# Patient Record
Sex: MALE | Race: WHITE
[De-identification: names, ages, dates, MRNs, and addresses within clinical notes are randomized per-mention and may not be internally consistent; named-entity substitution may affect disease eponyms.]

---

## 2018-01-10 ENCOUNTER — HOSPITAL ENCOUNTER (OUTPATIENT)
Dept: HOSPITAL 45 - C.MRI | Age: 33
Discharge: HOME | End: 2018-01-10
Attending: PHYSICIAN ASSISTANT
Payer: COMMERCIAL

## 2018-01-10 DIAGNOSIS — G93.9: ICD-10-CM

## 2018-01-10 DIAGNOSIS — A69.20: Primary | ICD-10-CM

## 2018-03-21 ENCOUNTER — HOSPITAL ENCOUNTER (OUTPATIENT)
Dept: HOSPITAL 45 - C.LAB1850 | Age: 33
Discharge: HOME | End: 2018-03-21
Attending: INTERNAL MEDICINE
Payer: COMMERCIAL

## 2018-03-21 DIAGNOSIS — F41.9: Primary | ICD-10-CM

## 2018-03-21 DIAGNOSIS — A69.20: ICD-10-CM

## 2018-03-21 DIAGNOSIS — R11.0: ICD-10-CM

## 2018-03-21 DIAGNOSIS — F32.9: ICD-10-CM

## 2018-03-21 DIAGNOSIS — G47.00: ICD-10-CM

## 2018-03-21 LAB
ALBUMIN SERPL-MCNC: 4.4 GM/DL (ref 3.4–5)
ALP SERPL-CCNC: 44 U/L (ref 45–117)
ALT SERPL-CCNC: 23 U/L (ref 12–78)
AST SERPL-CCNC: 14 U/L (ref 15–37)
BASOPHILS # BLD: 0.02 K/UL (ref 0–0.2)
BASOPHILS NFR BLD: 0.3 %
BUN SERPL-MCNC: 19 MG/DL (ref 7–18)
CALCIUM SERPL-MCNC: 9.4 MG/DL (ref 8.5–10.1)
CO2 SERPL-SCNC: 27 MMOL/L (ref 21–32)
CREAT SERPL-MCNC: 0.93 MG/DL (ref 0.6–1.4)
EOS ABS #: 0.05 K/UL (ref 0–0.5)
EOSINOPHIL NFR BLD AUTO: 245 K/UL (ref 130–400)
GLUCOSE SERPL-MCNC: 93 MG/DL (ref 70–99)
HCT VFR BLD CALC: 46.1 % (ref 42–52)
HGB BLD-MCNC: 16 G/DL (ref 14–18)
IG#: 0.01 K/UL (ref 0–0.02)
IMM GRANULOCYTES NFR BLD AUTO: 22.6 %
LYMPHOCYTES # BLD: 1.56 K/UL (ref 1.2–3.4)
MCH RBC QN AUTO: 30.1 PG (ref 25–34)
MCHC RBC AUTO-ENTMCNC: 34.7 G/DL (ref 32–36)
MCV RBC AUTO: 86.8 FL (ref 80–100)
MONO ABS #: 0.46 K/UL (ref 0.11–0.59)
MONOCYTES NFR BLD: 6.7 %
NEUT ABS #: 4.81 K/UL (ref 1.4–6.5)
NEUTROPHILS # BLD AUTO: 0.7 %
NEUTROPHILS NFR BLD AUTO: 69.6 %
PMV BLD AUTO: 9.6 FL (ref 7.4–10.4)
POTASSIUM SERPL-SCNC: 4.1 MMOL/L (ref 3.5–5.1)
PROT SERPL-MCNC: 7.7 GM/DL (ref 6.4–8.2)
RED CELL DISTRIBUTION WIDTH CV: 12.5 % (ref 11.5–14.5)
RED CELL DISTRIBUTION WIDTH SD: 40 FL (ref 36.4–46.3)
SODIUM SERPL-SCNC: 137 MMOL/L (ref 136–145)
WBC # BLD AUTO: 6.91 K/UL (ref 4.8–10.8)

## 2018-04-09 ENCOUNTER — HOSPITAL ENCOUNTER (OUTPATIENT)
Dept: HOSPITAL 45 - C.LAB1850 | Age: 33
Discharge: HOME | End: 2018-04-09
Attending: INTERNAL MEDICINE
Payer: COMMERCIAL

## 2018-04-09 DIAGNOSIS — L80: ICD-10-CM

## 2018-04-09 DIAGNOSIS — R10.9: Primary | ICD-10-CM

## 2018-04-09 DIAGNOSIS — F41.9: ICD-10-CM

## 2018-04-09 LAB
ALBUMIN SERPL-MCNC: 4.6 GM/DL (ref 3.4–5)
ALP SERPL-CCNC: 48 U/L (ref 45–117)
ALT SERPL-CCNC: 21 U/L (ref 12–78)
AST SERPL-CCNC: 11 U/L (ref 15–37)
BASOPHILS # BLD: 0.02 K/UL (ref 0–0.2)
BASOPHILS NFR BLD: 0.2 %
BUN SERPL-MCNC: 16 MG/DL (ref 7–18)
CALCIUM SERPL-MCNC: 9.6 MG/DL (ref 8.5–10.1)
CO2 SERPL-SCNC: 26 MMOL/L (ref 21–32)
CREAT SERPL-MCNC: 1.36 MG/DL (ref 0.6–1.4)
EOS ABS #: 0.07 K/UL (ref 0–0.5)
EOSINOPHIL NFR BLD AUTO: 283 K/UL (ref 130–400)
GLUCOSE SERPL-MCNC: 98 MG/DL (ref 70–99)
HCT VFR BLD CALC: 52.3 % (ref 42–52)
HGB BLD-MCNC: 18.3 G/DL (ref 14–18)
IG#: 0.02 K/UL (ref 0–0.02)
IMM GRANULOCYTES NFR BLD AUTO: 14.5 %
LYMPHOCYTES # BLD: 1.27 K/UL (ref 1.2–3.4)
MCH RBC QN AUTO: 29.7 PG (ref 25–34)
MCHC RBC AUTO-ENTMCNC: 35 G/DL (ref 32–36)
MCV RBC AUTO: 84.9 FL (ref 80–100)
MONO ABS #: 0.51 K/UL (ref 0.11–0.59)
MONOCYTES NFR BLD: 5.8 %
NEUT ABS #: 6.84 K/UL (ref 1.4–6.5)
NEUTROPHILS # BLD AUTO: 0.8 %
NEUTROPHILS NFR BLD AUTO: 78.5 %
PMV BLD AUTO: 10.2 FL (ref 7.4–10.4)
POTASSIUM SERPL-SCNC: 4.3 MMOL/L (ref 3.5–5.1)
PROT SERPL-MCNC: 7.8 GM/DL (ref 6.4–8.2)
RED CELL DISTRIBUTION WIDTH CV: 12.2 % (ref 11.5–14.5)
RED CELL DISTRIBUTION WIDTH SD: 37.7 FL (ref 36.4–46.3)
SODIUM SERPL-SCNC: 134 MMOL/L (ref 136–145)
WBC # BLD AUTO: 8.73 K/UL (ref 4.8–10.8)

## 2018-04-10 ENCOUNTER — HOSPITAL ENCOUNTER (OUTPATIENT)
Dept: HOSPITAL 45 - C.LABSPEC | Age: 33
Discharge: HOME | End: 2018-04-10
Attending: INTERNAL MEDICINE
Payer: COMMERCIAL

## 2018-04-10 DIAGNOSIS — R10.9: Primary | ICD-10-CM

## 2018-04-11 LAB — ANA SCREEN  TC 249X: NEGATIVE

## 2018-04-17 ENCOUNTER — HOSPITAL ENCOUNTER (OUTPATIENT)
Dept: HOSPITAL 45 - C.LAB1850 | Age: 33
Discharge: HOME | End: 2018-04-17
Attending: INTERNAL MEDICINE
Payer: COMMERCIAL

## 2018-04-17 DIAGNOSIS — R10.9: Primary | ICD-10-CM

## 2018-04-17 LAB
ALBUMIN SERPL-MCNC: 4.2 GM/DL (ref 3.4–5)
ALP SERPL-CCNC: 59 U/L (ref 45–117)
ALT SERPL-CCNC: 21 U/L (ref 12–78)
AST SERPL-CCNC: 11 U/L (ref 15–37)
BASOPHILS # BLD: 0.04 K/UL (ref 0–0.2)
BASOPHILS NFR BLD: 0.6 %
BUN SERPL-MCNC: 16 MG/DL (ref 7–18)
CALCIUM SERPL-MCNC: 9 MG/DL (ref 8.5–10.1)
CO2 SERPL-SCNC: 29 MMOL/L (ref 21–32)
CREAT SERPL-MCNC: 0.93 MG/DL (ref 0.6–1.4)
EOS ABS #: 0.06 K/UL (ref 0–0.5)
EOSINOPHIL NFR BLD AUTO: 283 K/UL (ref 130–400)
GLUCOSE SERPL-MCNC: 92 MG/DL (ref 70–99)
HCT VFR BLD CALC: 47.7 % (ref 42–52)
HGB BLD-MCNC: 16.3 G/DL (ref 14–18)
IG#: 0.01 K/UL (ref 0–0.02)
IMM GRANULOCYTES NFR BLD AUTO: 20.9 %
LIPASE: 190 U/L (ref 73–393)
LYMPHOCYTES # BLD: 1.32 K/UL (ref 1.2–3.4)
MCH RBC QN AUTO: 29.4 PG (ref 25–34)
MCHC RBC AUTO-ENTMCNC: 34.2 G/DL (ref 32–36)
MCV RBC AUTO: 86.1 FL (ref 80–100)
MONO ABS #: 0.41 K/UL (ref 0.11–0.59)
MONOCYTES NFR BLD: 6.5 %
NEUT ABS #: 4.49 K/UL (ref 1.4–6.5)
NEUTROPHILS # BLD AUTO: 0.9 %
NEUTROPHILS NFR BLD AUTO: 70.9 %
PMV BLD AUTO: 10 FL (ref 7.4–10.4)
POTASSIUM SERPL-SCNC: 4.3 MMOL/L (ref 3.5–5.1)
PROT SERPL-MCNC: 7.5 GM/DL (ref 6.4–8.2)
RED CELL DISTRIBUTION WIDTH CV: 12.3 % (ref 11.5–14.5)
RED CELL DISTRIBUTION WIDTH SD: 39.4 FL (ref 36.4–46.3)
SODIUM SERPL-SCNC: 137 MMOL/L (ref 136–145)
WBC # BLD AUTO: 6.33 K/UL (ref 4.8–10.8)

## 2018-04-19 ENCOUNTER — HOSPITAL ENCOUNTER (OUTPATIENT)
Dept: HOSPITAL 45 - C.LAB1850 | Age: 33
Discharge: HOME | End: 2018-04-19
Attending: INTERNAL MEDICINE
Payer: COMMERCIAL

## 2018-04-19 DIAGNOSIS — E86.0: Primary | ICD-10-CM

## 2018-04-19 DIAGNOSIS — R19.8: ICD-10-CM

## 2018-05-20 ENCOUNTER — HOSPITAL ENCOUNTER (EMERGENCY)
Dept: HOSPITAL 45 - C.EDB | Age: 33
Discharge: HOME | End: 2018-05-20
Payer: COMMERCIAL

## 2018-05-20 VITALS
WEIGHT: 149.25 LBS | WEIGHT: 149.25 LBS | BODY MASS INDEX: 20.9 KG/M2 | HEIGHT: 70.98 IN | BODY MASS INDEX: 20.9 KG/M2 | HEIGHT: 70.98 IN

## 2018-05-20 VITALS — OXYGEN SATURATION: 96 % | DIASTOLIC BLOOD PRESSURE: 89 MMHG | SYSTOLIC BLOOD PRESSURE: 123 MMHG | HEART RATE: 94 BPM

## 2018-05-20 VITALS — TEMPERATURE: 98.06 F

## 2018-05-20 DIAGNOSIS — K64.5: Primary | ICD-10-CM

## 2018-05-20 DIAGNOSIS — Z79.899: ICD-10-CM

## 2018-05-20 DIAGNOSIS — J45.909: ICD-10-CM

## 2018-05-20 NOTE — EMERGENCY ROOM VISIT NOTE
ED Visit Note


First contact with patient:  11:52


CHIEF COMPLAINT: Rectal pain, lump








HISTORY OF PRESENT ILLNESS: This 32-year-old male patient presents to the 

emergency department, ambulatory, complaining of a lump projecting from the 

rectum with severe rectal pain.  The patient states the pain began yesterday, 

and is worse with moving his bowels.  He states the pain has worsened over the 

past 24 hours.  He denies any constipation or blood in his stool.  He does 

report significant pain with bowel movements.  He denies recent illness 

including nausea, vomiting, constipation.  He denies any fever.  He states this 

morning he noticed a protrusion from the rectum and became concerned for his 

intestine protruding.  The patient denies any history of hemorrhoids.  He does 

report a history of "GI issues", and states he is currently being worked up for 

a gastric ulcer/gastritis.  He has not seen a GI doctor or general surgeon 

regarding any of his concerns.  He denies any urinary symptoms.  He denies any 

abdominal or back pain.  He denies any injury.  He is sexually active, but 

denies any anal penetration.








REVIEW OF SYSTEMS: A 10 system review of systems was performed with positives 

and pertinent negatives listed in the history of present illness. All other 

systems were reviewed and are negative. 








ALLERGIES: None








MEDICATIONS: Ativan, lactobacillus acidophilus, probiotics








PMH: Lyme disease, recommend spotted fever, Rickettsia, asthma, "GI issues"








SOCIAL HISTORY: The patient is locally with family.  He denies drug, alcohol, 

tobacco use.








PHYSICAL EXAM: 


VITALS: Vitals are noted on the nurse's note and reviewed by myself.  Vital 

signs stable.


GENERAL: This is a 32-year-old white male, in no acute distress, nondiaphoretic

, well-developed well-nourished.


HEART: Regular rate and rhythm.  No murmurs, gallops, or rubs noted.


LUNGS: CTA bilaterally.  No wheezes, rhonchi, rales.


ABDOMEN: Positive bowel sounds all 4 quadrants.  Normal tympanic percussion.  

Soft, nontender to palpation.  No masses, guarding noted on palpation.  Rectal 

examination reveals a thrombosed hemorrhoid at on the right lateral aspect of 

the anus.  This is very tender to palpation and purple in color.  There is no 

active bleeding.  Hemoccult testing performed and was negative.








EMERGENCY DEPARTMENT COURSE: The patient was seen and evaluated as above.  His 

symptoms are consistent with a thrombosed hemorrhoid.  I did discuss options 

for management with the patient at bedside.  I did recommend incision and 

drainage of the thrombosed hemorrhoid.  The patient declined, and prefers to 

follow-up with the general surgeon this week.  I do feel that this is reasonable

, and discussed the potential benefits versus risks associated with not 

performing the procedure at this time.  The patient verbalized understanding.  

The patient will be started on Rectiv to help with the spasms and pain.  I 

encouraged him to use sitz bath and warm soaks to help with his symptoms.  The 

patient was encouraged to return immediately to the emergency department for 

worsening symptoms.  All questions answered to patient and his mother 

satisfaction.  Discharge instructions reviewed, the patient was discharged home 

in good condition.





I attest that I have personally reviewed the patient's current medication list. 


Patient was found to have normal blood pressure on screening and does not 

require follow-up. 





Differential diagnosis includes GI bleed, hemorrhoid, thrombosed hemorrhoid, 

anal fissure, constipation, malignancy, and others








DIAGNOSIS: Thrombosed external hemorrhoid





The chart was completed utilizing Dragon Speech voice recognition software. 

Grammatical errors, random word insertions, pronoun errors, and incomplete 

sentences are an occasional consequence of this system due to software 

limitations, ambient noise, and hardware issues. Any formal questions or 

concerns about the content, text, or information contained within the body of 

this dictation should be directly addressed to the provider for clarification.


Current/Historical Medications


Scheduled


Lactobacillus Acidophilus (Lactinex), 1 TAB PO DAILY


Lorazepam (Ativan), 0.25 MG PO DAILY





Scheduled PRN


Nitroglycerin (Intra-Anal) (Rectiv), 1 INCH RE Q12 PRN for rectal spasms





Allergies


Coded Allergies:  


     No Known Allergies (Unverified , 5/20/18)





Vital Signs











  Date Time  Temp Pulse Resp B/P (MAP) Pulse Ox O2 Delivery O2 Flow Rate FiO2


 


5/20/18 13:23  94 16 123/89 96   


 


5/20/18 12:26  89 16 121/83 97 Room Air  


 


5/20/18 11:40 36.7 91 18 132/86 100 Room Air  











Departure Information


Impression





 Primary Impression:  


 Hemorrhoids, external, thrombosed





Dispostion


Home / Self-Care





Condition


GOOD





Prescriptions





Nitroglycerin (Intra-Anal) (RECTIV) 0.4 % Oin


1 INCH RE Q12 Y for rectal spasms for 7 Days, #1 TUBE


   Prov: Chloe Bell, OLIVA         5/20/18





Referrals


ROCAEL Bright MD (PCP)








Marc Martinez M.D.





Patient Instructions


Hemorrhoids Thrombosed, My [facility]





Additional Instructions





You were seen in the emergency department today for a thrombosed external 

hemorrhoid.  I did offer to incise this to help alleviate pain.  As discussed, 

finalized care would include excision of the hemorrhoid.  This should be done 

by a general surgeon.  You have been provided with the contact information for 

the general surgeon, Dr. Martinez. Please contact his office tomorrow morning 

to establish follow-up.





Use Rectiv topical ointment up to twice daily for anal spasms.  This should 

help with some of your pain.





Ibuprofen(Motrin, Advil) may be used for fever or pain.  Use 600mg every six 

hours as needed.  Take with food.  Avoid using more than 2400mg in a 24 hour 

period.  Do not use 2400mg per day for more than three consecutive days without 

physician direction.  Prolonged inappropriate use can lead to stomach upset or 

ulcers. 


(AND/OR)


Acetaminophen(Tylenol) may be used for fever or pain.  Use 1000mg every six 

hours as needed.  Avoid using more than 3000mg in a 24 hour period.  





Use sitz bath and warm compresses to help with pain and swelling.





You may want to consider a stool softener, high-fiber diet, and plenty of water 

to help soften the stool and make bowel movements less painful.





Follow-up with the general surgeon/your PCP for worsening symptoms or 

recurrence.





Return immediately to the emergency department for significantly worsening pain

, swelling, inability to have a bowel movement, redness, fever, chills, or 

other concerning symptoms.

## 2018-05-21 ENCOUNTER — HOSPITAL ENCOUNTER (EMERGENCY)
Dept: HOSPITAL 45 - C.EDB | Age: 33
Discharge: HOME | End: 2018-05-21
Payer: COMMERCIAL

## 2018-05-21 VITALS
BODY MASS INDEX: 20.99 KG/M2 | BODY MASS INDEX: 20.99 KG/M2 | WEIGHT: 149.91 LBS | HEIGHT: 70.98 IN | HEIGHT: 70.98 IN | WEIGHT: 149.91 LBS

## 2018-05-21 VITALS
HEART RATE: 85 BPM | DIASTOLIC BLOOD PRESSURE: 83 MMHG | OXYGEN SATURATION: 97 % | SYSTOLIC BLOOD PRESSURE: 126 MMHG | TEMPERATURE: 97.34 F

## 2018-05-21 DIAGNOSIS — G89.29: ICD-10-CM

## 2018-05-21 DIAGNOSIS — K64.5: Primary | ICD-10-CM

## 2018-05-21 DIAGNOSIS — R10.9: ICD-10-CM

## 2018-05-21 NOTE — EMERGENCY ROOM VISIT NOTE
History


First contact with patient:  14:31


Chief Complaint:  RECTAL BLEEDING


Stated Complaint:  BOWEL PAIN


Nursing Triage Summary:  


pt reports that he was seen here yesterday and diagnosed with hemorrhoids. pt 


was to follow up but  appointment is taking too long. returned to have 


hemorrhoids lanced.





History of Present Illness


The patient is a 32 year old male who presents to the Emergency Room with 

complaints of rectal pain. 





He was here yesterday for rectal pain and an external hemorrhoid. He was given 

topical nitroglycerin to use but he has not picked it up and has not used it 

yet. His rectal pain started on Saturday afternoon and became worse yesterday. 

He says it is worse when witting and describes the pain as a 5/10 in severity. 

He notes that he had blood in his stool 1 month ago but has not had any blood 

when wiping. 





He has chronic abdominal pain that he is getting worked up as an outpatient. He 

has also felt nauseated. 





He denies any diarrhea, constipation, vomiting, weight loss, decrease appetite, 

fatigue.





He notes he has been diagnosed with Lyme disease, RMSF and Typhoid as an 

outpatient.





Review of Systems


CONSTITUTIONAL: No fever, chills, sweats or night sweats. No recent infections. 

No weight loss or weight gain. 


NEUROLOGIC: No headaches, dizziness or syncopal episodes. 


HEENT: No hearing or visual changes. No sinus or nasal issues. No mouth sores,

thrush or oral lesions. 


CARDIOVASCULAR: No chest pain or palpitations. 


RESPIRATORY: No SOB, dyspnea, cough or hemoptysis. 


GASTROINTESTINAL: No nausea, vomiting, diarrhea, constipation, reflux, melena 

or hematochezia. Chronic abdominal pain


GENITOURINARY: No dysuria, frequency, urgency, incontinence or hematuria. 


SKIN: No rashes or skin lesions. No hair loss or nail changes. 


HEMATOLOGIC: No bleeding or abnormal bruising





Past Medical/Surgical History


Medical Problems:


(1) Anxiety


(2) Chronic pain


(3) Depression


(4) Lyme disease


(5) Panic disorder


(6) Suicidal ideation


RMSF, Typhoid





Family History





Patient reports no known family medical history.


Non contributory





Social History


Smoking Status:  Never Smoker


Alcohol Use:  none


Drug Use:  none


Marital Status:  single


Housing Status:  lives with family


Occupation Status:  unemployed





Current/Historical Medications


Scheduled


Probiotic Product (Probiotic), 1 CAP PO DAILY





Scheduled PRN


Lorazepam (Ativan), 0.25 MG PO DAILY PRN for Anxiety


Nitroglycerin (Intra-Anal) (Rectiv), 1 INCH RE Q12 PRN for rectal spasms


Probiotic Product (Probiotic), 1 CAP PO BID PRN for





Physical Exam


Vital Signs











  Date Time  Temp Pulse Resp B/P (MAP) Pulse Ox O2 Delivery O2 Flow Rate FiO2


 


5/21/18 14:18 36.3 99 16 126/83 95 Room Air  











Physical Exam


HEENT: Head - normocephalic and atraumatic.  Pupils are equal, round, and 

reactive to light.  Extraocular eye muscles are intact and sclera are 

anicteric.  .  Nose -  moist nasal mucosa without discharge. Mouth - moist 

buccal mucosa.  Oropharynx is nonerythematous and there is no tonsillar exudate 

or edema noted.


Neck: Supple; no JVD, nuchal rigidity, cervical lymphadenopathy, or auscultated 

bruits.


Heart: Regular rate and rhythm.  There is a normal S1 and S2 with no murmurs, 

clicks, or gallops appreciated.


Lungs: Clear to auscultation bilaterally with no wheezes, rales, or rhonchi.


Abdomen: Soft, completely nontender, nondistended, with good bowel sounds.  

There are no palpable pulsatile masses or hepatosplenomegaly.  There is no 

guarding, rigidity, or rebound noted.


Rectal: 3 oclock partially thrombosed hemorrhoid (pink in colour), non tender 

to palpation


Extremities: No evidence of cyanosis, clubbing, or edema.  There are easily 

palpable peripheral pulses.





Medical Decision & Procedures


ED Course


1445: The patient was assessed and examined





1450: I spoke to Dr. Ryder and discussed the plan





1500: Spoke to the General Surgeon PA on call and discussed the case with her. 

She said the patient could either follow up on Wednesday with surgeon or have 

the hemorrhoid excised in the ED.





1515: Discussed the case with the patient and it was decided to continue with 

his appointment with the general surgeon on Wednesday for further management of 

his external hemorrhoid. He was agreeable to this plan.





Medical Decision


Patient here with external hemorrhoid. We discussed the option of excision here 

versus waiting for his appointment with the general surgeon on Wednesday. It 

was decided that he was going to wait until Wednesday and continue with 

conservative measures at home which include sitz baths, and topical treatment.





Impression





 Primary Impression:  


 External hemorrhoid





Departure Information


Referrals


ROCAEL Bright MD (PCP)





Patient Instructions


[facility]

## 2018-05-21 NOTE — EMERGENCY ROOM VISIT NOTE
ED Visit Note


First contact with patient:  14:31


Resident Physician Supervision Note:





I was present with Dr. Frankel during the history and exam. I discussed the 

case with the resident and agree with the findings and plan as documented in 

the note.  Any exceptions or clarifications are listed here: 





Documented By:  Alexander Ryder

## 2021-11-01 NOTE — DIAGNOSTIC IMAGING REPORT
MRI OF THE BRAIN WITHOUT AND WITH IV CONTRAST



CLINICAL HISTORY: A69.20 Lyme [id number]    



COMPARISON STUDY:  CT scan of the head dated 10/31/2017 



TECHNIQUE: MRI of the brain was performed from the vertex to the skull base

utilizing various T1 and T2 weighted sequences. Following the IV administration

of 6.5 mL of Gadavist contrast, additional enhanced images were obtained.



FINDINGS: 

Sagittal T1, axial diffusion, proton density and T2 weighted axial, coronal

FLAIR, and pre and post axial T1-weighted images were acquired. These were

supplemented with post gadolinium coronal T1 weighted images. 

No intra or extra-axial mass lesions are visualized.

Axial diffusion-weighted images reveal no evidence of acute or subacute

infarction.

There is no evidence of ventricular dilatation.

Proton density T2-weighted and FLAIR images reveal no significant

intraparenchymal signal abnormalities.

There are no abnormal flow voids.

There is no evidence of pathologic intracranial enhancement.



There is a right frontal mixed signal scalp lesion measuring 50 x 24 x 10 mm.

This demonstrates mild postcontrast enhancement. There appear to be several flow

voids present. This likely represents a vascular lesion. Clinical correlation is

recommended.





IMPRESSION:  

1. 50 x 24 x 10 mm mixed signal intensity right frontal scalp lesion. Single

characteristics raise the possibility of a vascular malformation. Clinical

correlation is advocated, as this lesion should be palpable

2. Otherwise normal MRI the brain. No intracranial abnormalities identified







Electronically signed by:  Willie Serrano M.D.

1/10/2018 6:50 PM



Dictated Date/Time:  1/10/2018 6:44 PM PAST MEDICAL HISTORY:  No pertinent past medical history